# Patient Record
(demographics unavailable — no encounter records)

---

## 2024-10-21 NOTE — HISTORY OF PRESENT ILLNESS
[FreeTextEntry1] : 22 y/o G 0 female, LMP: 10/2/24 presents for f/u visit c/o cyclical right lower pelvic pain radiates to right lower back, always 14 days after menses, on OCP, denies missing any doses.  Saw Dr Barrios 3 mths ago for similar complaints and had tvus: grossly nml.    Menstrual Cycle: regular, monthly, mild pain and bleeding. Sexual Activity: monogamous with BF Contraception: OCP, condoms Social/Mental Health: reports social ETOH, denies tobacco or any illicit drug use.  Denies depression, anxiety, thoughts of personal harm or suicidal ideation.  ROS:  Denies fever/chills, HA, Cough/sore throat, CP, SOB, N/V, Diarrhea/Constipation, Pelvic pain, Urinary frequency/urgency/incontinence, irregular vaginal bleeding, discharge or irritation.    Medical History GYN HX: PCOS? PMH: depression, anxiety, ADHD PSH: wisdom teeth Meds: OCP, mydayis, lamotrigine, bupropion-following neurologist Allergies: NKDA fam hx: mother breast CA, patients mother BRCA neg. [Patient reported PAP Smear was normal] : Patient reported PAP Smear was normal [PapSmeardate] : 10/26/23 [Normal Amount/Duration] :  normal amount and duration [Patient refuses STI testing] : Patient refuses STI testing

## 2024-10-21 NOTE — PHYSICAL EXAM
[Chaperone Present] : A chaperone was present in the examining room during all aspects of the physical examination [[id number]] : A chaperone was present during the pelvic exam. [FreeTextEntry2] : Christa RANDOLPH [Appropriately responsive] : appropriately responsive [Alert] : alert [No Acute Distress] : no acute distress [Soft] : soft [Non-tender] : non-tender [Non-distended] : non-distended [No Lesions] : no lesions [No Mass] : no mass [Oriented x3] : oriented x3 [Labia Majora] : normal [Labia Minora] : normal [Normal] : normal [Uterine Adnexae] : normal

## 2024-10-21 NOTE — DISCUSSION/SUMMARY
[FreeTextEntry1] : repeat tvus discussed PCOS s/s and treatment options, discussed mittelschmerz and NSAIDS f/u for annual. patient and patients mother verbalized understanding.